# Patient Record
Sex: FEMALE | Race: WHITE | NOT HISPANIC OR LATINO | Employment: OTHER | URBAN - METROPOLITAN AREA
[De-identification: names, ages, dates, MRNs, and addresses within clinical notes are randomized per-mention and may not be internally consistent; named-entity substitution may affect disease eponyms.]

---

## 2022-10-06 ENCOUNTER — TELEPHONE (OUTPATIENT)
Dept: FAMILY MEDICINE CLINIC | Facility: CLINIC | Age: 57
End: 2022-10-06

## 2022-10-06 NOTE — TELEPHONE ENCOUNTER
Caller: Lashawn Siu    Relationship to patient: Self    Best call back number: [phone]    Patient is needing: PATIENT NEEDS BEHAVIORAL HEALTH CLEARANCE FOR THE ,  UNABLE TO GET BEHAVIORAL HEALTH ON THE PHONE, UNSURE IF SHE NEEDS TO HAVE A REFERRAL OR CAN SET UP AS NEW PATIENT WITH THEM DARIO CONTACT HER AND ADVISE

## 2022-10-07 NOTE — TELEPHONE ENCOUNTER
Lashawn is not a pt at our office. I did let her know that she does need a referral to behavioral health. I asked her if she had a PCP she said yes in [facility]. Pt then said she would just call her PCP and go from there.